# Patient Record
Sex: FEMALE | Race: WHITE | NOT HISPANIC OR LATINO | Employment: UNEMPLOYED | URBAN - METROPOLITAN AREA
[De-identification: names, ages, dates, MRNs, and addresses within clinical notes are randomized per-mention and may not be internally consistent; named-entity substitution may affect disease eponyms.]

---

## 2017-10-06 ENCOUNTER — HOSPITAL ENCOUNTER (EMERGENCY)
Facility: HOSPITAL | Age: 32
Discharge: HOME | End: 2017-10-06
Attending: EMERGENCY MEDICINE | Admitting: EMERGENCY MEDICINE
Payer: COMMERCIAL

## 2017-10-06 ENCOUNTER — APPOINTMENT (EMERGENCY)
Dept: CT IMAGING | Facility: HOSPITAL | Age: 32
End: 2017-10-06
Payer: COMMERCIAL

## 2017-10-06 VITALS
WEIGHT: 228.18 LBS | RESPIRATION RATE: 20 BRPM | TEMPERATURE: 98.3 F | OXYGEN SATURATION: 96 % | BODY MASS INDEX: 36.67 KG/M2 | SYSTOLIC BLOOD PRESSURE: 152 MMHG | HEIGHT: 66 IN | HEART RATE: 104 BPM | DIASTOLIC BLOOD PRESSURE: 75 MMHG

## 2017-10-06 DIAGNOSIS — S49.90XA: Primary | ICD-10-CM

## 2017-10-06 LAB
ANION GAP BLD CALC-SCNC: 10 MMOL/L (ref 4–13)
ANION GAP BLD CALC-SCNC: 15 MMOL/L (ref 4–13)
BUN BLD-MCNC: 11 MG/DL (ref 5–25)
BUN BLD-MCNC: 8 MG/DL (ref 5–25)
CA-I BLD-SCNC: 0.99 MMOL/L (ref 1.12–1.32)
CA-I BLD-SCNC: 1.16 MMOL/L (ref 1.12–1.32)
CHLORIDE BLD-SCNC: 106 MMOL/L (ref 100–108)
CHLORIDE BLD-SCNC: 108 MMOL/L (ref 100–108)
CREAT BLD-MCNC: 0.9 MG/DL (ref 0.6–1.3)
CREAT BLD-MCNC: 0.9 MG/DL (ref 0.6–1.3)
EXT PREG TEST URINE: NEGATIVE
GFR SERPL CREATININE-BSD FRML MDRD: 84 ML/MIN/1.73SQ M
GFR SERPL CREATININE-BSD FRML MDRD: 84 ML/MIN/1.73SQ M
GLUCOSE SERPL-MCNC: 108 MG/DL (ref 65–140)
GLUCOSE SERPL-MCNC: 96 MG/DL (ref 65–140)
HCT VFR BLD CALC: 40 % (ref 34.8–46.1)
HCT VFR BLD CALC: 41 % (ref 34.8–46.1)
HGB BLDA-MCNC: 13.6 G/DL (ref 11.5–15.4)
HGB BLDA-MCNC: 13.9 G/DL (ref 11.5–15.4)
PCO2 BLD: 24 MMOL/L (ref 21–32)
PCO2 BLD: 25 MMOL/L (ref 21–32)
POTASSIUM BLD-SCNC: 3.9 MMOL/L (ref 3.5–5.3)
POTASSIUM BLD-SCNC: 7 MMOL/L (ref 3.5–5.3)
SODIUM BLD-SCNC: 135 MMOL/L (ref 136–145)
SODIUM BLD-SCNC: 140 MMOL/L (ref 136–145)
SPECIMEN SOURCE: ABNORMAL
SPECIMEN SOURCE: ABNORMAL

## 2017-10-06 PROCEDURE — 80047 BASIC METABLC PNL IONIZED CA: CPT

## 2017-10-06 PROCEDURE — 81025 URINE PREGNANCY TEST: CPT | Performed by: EMERGENCY MEDICINE

## 2017-10-06 PROCEDURE — 96374 THER/PROPH/DIAG INJ IV PUSH: CPT

## 2017-10-06 PROCEDURE — 99284 EMERGENCY DEPT VISIT MOD MDM: CPT

## 2017-10-06 PROCEDURE — 71260 CT THORAX DX C+: CPT

## 2017-10-06 PROCEDURE — 85014 HEMATOCRIT: CPT

## 2017-10-06 RX ORDER — OXYCODONE HYDROCHLORIDE AND ACETAMINOPHEN 5; 325 MG/1; MG/1
1 TABLET ORAL ONCE
Status: COMPLETED | OUTPATIENT
Start: 2017-10-06 | End: 2017-10-06

## 2017-10-06 RX ORDER — DIAZEPAM 5 MG/1
5 TABLET ORAL EVERY 8 HOURS PRN
Qty: 10 TABLET | Refills: 0 | Status: SHIPPED | OUTPATIENT
Start: 2017-10-06 | End: 2017-10-11

## 2017-10-06 RX ORDER — DIAZEPAM 5 MG/1
5 TABLET ORAL ONCE
Status: COMPLETED | OUTPATIENT
Start: 2017-10-06 | End: 2017-10-06

## 2017-10-06 RX ORDER — KETOROLAC TROMETHAMINE 30 MG/ML
15 INJECTION, SOLUTION INTRAMUSCULAR; INTRAVENOUS ONCE
Status: COMPLETED | OUTPATIENT
Start: 2017-10-06 | End: 2017-10-06

## 2017-10-06 RX ORDER — NAPROXEN 500 MG/1
500 TABLET ORAL 2 TIMES DAILY WITH MEALS
Qty: 15 TABLET | Refills: 0 | Status: SHIPPED | OUTPATIENT
Start: 2017-10-06 | End: 2017-10-13

## 2017-10-06 RX ADMIN — KETOROLAC TROMETHAMINE 15 MG: 30 INJECTION, SOLUTION INTRAMUSCULAR at 14:29

## 2017-10-06 RX ADMIN — DIAZEPAM 5 MG: 5 TABLET ORAL at 15:46

## 2017-10-06 RX ADMIN — IOHEXOL 85 ML: 350 INJECTION, SOLUTION INTRAVENOUS at 14:39

## 2017-10-06 RX ADMIN — OXYCODONE HYDROCHLORIDE AND ACETAMINOPHEN 1 TABLET: 5; 325 TABLET ORAL at 17:04

## 2017-10-06 NOTE — DISCHARGE INSTRUCTIONS
Use valium for muscle relaxation at home  Use naproxen for pain control  Use the sling for comfort  RICE Therapy   WHAT YOU NEED TO KNOW:   What is RICE therapy? RICE therapy is a 4-step process used to reduce swelling and pain from an injury  RICE stands for rest, ice, compression, and elevation  RICE should be done within the first 24 to 48 hours after an injury  How do I use RICE therapy? · Rest  the injured area so that it can heal  You may need to avoid putting any weight on your injury for at least 48 hours  Return to normal activities as directed  · Ice  the injury for 20 minutes every 4 hours, or as directed  Use an ice pack, or put crushed ice in a plastic bag  Cover it with a towel to protect your skin  Ice helps prevent tissue damage and decreases swelling and pain  · Compress  the injury with an elastic bandage, air cast, special boot, or splint to reduce swelling  Ask your healthcare provider which compression device to use, and how tight it should be  · Elevate  the injured area above the level of your heart as often as you can  This will help decrease swelling and pain  If possible, prop the injured area on pillows or blankets to keep it elevated comfortably  When should I contact my healthcare provider? · Your pain does not decrease, even after treatment  · You have questions or concerns about your condition or care  When should I seek immediate care? · You have severe pain in the injured area  · You have numbness in the injured area  · You cannot put any weight on or move the injured area  CARE AGREEMENT:   You have the right to help plan your care  Learn about your health condition and how it may be treated  Discuss treatment options with your caregivers to decide what care you want to receive  You always have the right to refuse treatment  The above information is an  only   It is not intended as medical advice for individual conditions or treatments  Talk to your doctor, nurse or pharmacist before following any medical regimen to see if it is safe and effective for you  © 2017 [address] [employer] Information is for End User's use only and may not be sold, redistributed or otherwise used for commercial purposes  All illustrations and images included in CareNotes® are the copyrighted property of [employer]  or [address]  Shoulder Pain   WHAT YOU NEED TO KNOW:   What do I need to know about shoulder pain? Shoulder pain is a common problem and can affect your daily activities  Pain can be caused by a problem within your shoulder  Shoulder pain may also be caused by pain that spreads to your shoulder from another part of your body  What increases my risk for shoulder pain? · Repeated overhead activities, such as baseball, weight lifting, or swimming    · Medical conditions, such as rotator cuff disease, diabetes, or thyroid disorders    · A quick increase in the amount or intensity of exercises, or improper technique during exercise    · Muscle imbalance or weakness    · Trauma or a fall    · Age older than 61  How is shoulder pain diagnosed? Your healthcare provider will ask about your pain, including how and when it started  Tell him if you have any weakness or if there was an injury  He will examine your shoulder and do movement tests to see how well you can move your shoulder  You may also need any of the following tests:  · An x-ray, ultrasound, CT, or MRI  may be needed to show the cause of your shoulder pain  You may be given contrast liquid to help the shoulder area show up better in the pictures  Tell the healthcare provider if you have ever had an allergic reaction to contrast dye  Do not enter the MRI room with anything metal  Metal can cause serious injury  Tell the healthcare provider if you have any metal in or on your body      · An electromyography test  measures the electrical activity of your muscles at rest and with movement  How is shoulder pain treated? · Acetaminophen  decreases pain and fever  It is available without a doctor's order  Ask how much to take and how often to take it  Follow directions  Acetaminophen can cause liver damage if not taken correctly  · NSAIDs , such as ibuprofen, help decrease swelling, pain, and fever  This medicine is available with or without a doctor's order  NSAIDs can cause stomach bleeding or kidney problems in certain people  If you take blood thinner medicine, always ask your healthcare provider if NSAIDs are safe for you  Always read the medicine label and follow directions  · A steroid injection  may help decrease pain and swelling  · Surgery  may be needed for long-term pain and loss of function  How can I manage my symptoms? · Apply ice  on your shoulder for 20 to 30 minutes every 2 hours or as directed  Use an ice pack, or put crushed ice in a plastic bag  Cover it with a towel  Ice helps prevent tissue damage and decreases swelling and pain  · Apply heat if ice does not help your symptoms  Apply heat on your shoulder for 20 to 30 minutes every 2 hours for as many days as directed  Heat helps decrease pain and muscle spasms  · Go to physical or occupational therapy as directed  A physical therapist teaches you exercises to help improve movement and strength, and to decrease pain  An occupational therapist teaches you skills to help with your daily activities  How can I help prevent shoulder pain? · Stretch and strengthen your shoulder  Use proper technique during exercises and sports  · Limit activities as directed  Try to avoid repeated overhead movements  When should I seek immediate care or call 911? · You have severe pain  · You cannot move your arm or shoulder  · You have numbness or tingling in your shoulder or arm  When should I contact my healthcare provider?    · Your pain gets worse or does not go away with treatment  · You have trouble moving your arm or shoulder  · You have questions or concerns about your condition or care  CARE AGREEMENT:   You have the right to help plan your care  Learn about your health condition and how it may be treated  Discuss treatment options with your caregivers to decide what care you want to receive  You always have the right to refuse treatment  The above information is an  only  It is not intended as medical advice for individual conditions or treatments  Talk to your doctor, nurse or pharmacist before following any medical regimen to see if it is safe and effective for you  © 2017 [address] [facility] Information is for End User's use only and may not be sold, redistributed or otherwise used for commercial purposes  All illustrations and images included in CareNotes® are the copyrighted property of [employer]  or [employer]

## 2017-10-06 NOTE — ED PROVIDER NOTES
History  Chief Complaint   Patient presents with    Shoulder Injury     Pt was coming down water slide and rolled sideways onto right side and landed on right shoulder  Pt states pain in right shoulder and under right ribs  Pt had X-rays done at [address] [place] [address] [address]     27-year-old a dramatic female presents to the emergency department after having a shoulder injury today  She was going down the water slide today, got her shoulder caught on the way down and suffered injury to her R shoulder  She [facility] to [facility] Urgent [facility] and had an x-ray performed which was negative of her shoulder  They told her that she did not have osseous injury to these areas  However she was unsatisfied with these results and came to our hospital for further evaluation  She is still having pain in her shoulder, she is severely distressed and crying because she wants pain medicine  Nothing that she has taken thus far has been sufficient to control her pain  She has no history of injury to this shoulder in the past   She is not having any difficulty breathing  No chest pain  There is no palpable abnormality to her shoulder or arm  He has decreased range of motion secondary to pain but she is neurovascularly intact distal to the injury  None       History reviewed  No pertinent past medical history  History reviewed  No pertinent surgical history  History reviewed  No pertinent family history  I have reviewed and agree with the history as documented  Social History   Substance Use Topics    Smoking status: Current Every Day Smoker    Smokeless tobacco: Never Used    Alcohol use No        Review of Systems   Constitutional: Negative for chills, fatigue and fever  Respiratory: Negative for apnea, cough, chest tightness and shortness of breath  Cardiovascular: Negative for chest pain and palpitations  Gastrointestinal: Negative for abdominal pain, nausea and vomiting     Musculoskeletal: Negative for back pain and neck pain  Right shoulder pain, decreased range of motion secondary to pain   Skin: Negative for color change, rash and wound  Allergic/Immunologic: Negative for immunocompromised state  Neurological: Negative for dizziness, syncope, weakness, numbness and headaches  Physical Exam  ED Triage Vitals [10/06/17 1326]   Temperature Pulse Respirations Blood Pressure SpO2   98 3 °F (36 8 °C) 104 20 152/75 96 %      Temp Source Heart Rate Source Patient Position - Orthostatic VS BP Location FiO2 (%)   Oral Monitor Sitting Left arm --      Pain Score       Worst Possible Pain           Physical Exam   Constitutional: She is oriented to person, place, and time  She appears well-developed and well-nourished  She appears distressed (Very anxious, tearful, refuses to range arm,)  HENT:   Head: Normocephalic and atraumatic  Mouth/Throat: Oropharynx is clear and moist    Eyes: Conjunctivae and EOM are normal  Pupils are equal, round, and reactive to light  Right eye exhibits no discharge  Left eye exhibits no discharge  No scleral icterus  Neck: Normal range of motion  Neck supple  No JVD present  Cardiovascular: Normal rate, regular rhythm, normal heart sounds and intact distal pulses  Exam reveals no gallop and no friction rub  No murmur heard  Pulmonary/Chest: Effort normal and breath sounds normal  No respiratory distress  She has no wheezes  She has no rales  She exhibits no tenderness  Abdominal: Soft  Bowel sounds are normal  She exhibits no distension  There is no tenderness  There is no guarding  Musculoskeletal: She exhibits tenderness  She exhibits no edema or deformity  Tenderness to right shoulder without deformity  Decreased range of motion secondary to pain  She is holding her arm splinting and across her abdomen secondary to pain in her shoulder however there are no wounds, no ecchymosis  Neurological: She is alert and oriented to person, place, and time   No cranial nerve deficit  Skin: Skin is warm and dry  No rash noted  She is not diaphoretic  No erythema  No pallor  No wound, no ecchymosis, no external evidence of trauma to the arm or shoulder or chest wall  Psychiatric:   Anxious, dramatic regarding injuries   Vitals reviewed        ED Medications  Medications   ketorolac (TORADOL) 30 mg/mL injection 15 mg (15 mg Intravenous Given 10/6/17 1429)   iohexol (OMNIPAQUE) 350 MG/ML injection (MULTI-DOSE) 85 mL (85 mL Intravenous Given 10/6/17 1439)   diazepam (VALIUM) tablet 5 mg (5 mg Oral Given 10/6/17 1546)   oxyCODONE-acetaminophen (PERCOCET) 5-325 mg per tablet 1 tablet (1 tablet Oral Given 10/6/17 5804)       Diagnostic Studies  Labs Reviewed   POCT CHEM 8+ - Abnormal        Result Value Ref Range Status    SODIUM, I-STAT 135 (*) 136 - 145 mmol/l Final    Potassium, i-STAT 7 0 (*) 3 5 - 5 3 mmol/L Final    Calcium, Ionized i-STAT 0 99 (*) 1 12 - 1 32 mmol/L Final    Chloride, istat 108  100 - 108 mmol/L Final    CO2, i-STAT 25  21 - 32 mmol/L Final    Anion Gap, Istat 10  4 - 13 mmol/L Final    BUN, I-STAT 11  5 - 25 mg/dl Final    Creatinine, i-STAT 0 9  0 6 - 1 3 mg/dl Final    eGFR 84  ml/min/1 73sq m Final    Glucose, i-STAT 108  65 - 140 mg/dl Final    Hct, i-STAT 41  34 8 - 46 1 % Final    Hgb, i-STAT 13 9  11 5 - 15 4 g/dl Final    Specimen Type VENOUS   Final   POCT CHEM 8+ - Abnormal     Anion Gap, Istat 15 (*) 4 - 13 mmol/L Final    SODIUM, I-STAT 140  136 - 145 mmol/l Final    Potassium, i-STAT 3 9  3 5 - 5 3 mmol/L Final    Chloride, istat 106  100 - 108 mmol/L Final    CO2, i-STAT 24  21 - 32 mmol/L Final    Calcium, Ionized i-STAT 1 16  1 12 - 1 32 mmol/L Final    BUN, I-STAT 8  5 - 25 mg/dl Final    Creatinine, i-STAT 0 9  0 6 - 1 3 mg/dl Final    eGFR 84  ml/min/1 73sq m Final    Glucose, i-STAT 96  65 - 140 mg/dl Final    Hct, i-STAT 40  34 8 - 46 1 % Final    Hgb, i-STAT 13 6  11 5 - 15 4 g/dl Final    Specimen Type VENOUS   Final   POCT PREGNANCY, URINE - Normal    EXT PREG TEST UR (Ref: Negative) negative   Final       CT chest with contrast   ED Interpretation   No acute pathology  No acute fracture  No destructive osseous   lesion  Final Result      No acute pathology  Workstation performed: [id number]             Procedures  Procedures      Phone Contacts  ED Phone Contact    ED Course  ED Course as of Oct 16 1732   Fri Oct 06, 2017   1546 Discussed the risks and benefits of x-ray to evaluate for osseous injury in light of the fact that there would be no benefit and no change in management  Patient agrees and does not want an x-ray performed  As such we will attempt further pain control for her pain  This seems likely 2/2 anxiety                                MDM  Number of Diagnoses or Management Options  Traumatic injury of shoulder:   Diagnosis management comments: X-rays were negative for osseous injury  Still in extreme pain  Will do a CT scan to rule out rib injury and shoulder injury  Patient denies that anything given here was helpful for pain control  She is still distress secondary to pain  The CT scan is negative for injury  Advised follow up w PCP for further eval and pain control  Discussed with patient and they understood the risks and benefits of discharge  Patient had opportunity to ask questions regarding care and discharge instructions and had no further questions  Advised follow up with PCP, advised returning if worsening, and discussed disease specific return precautions  Patient understood discharge instructions  CritCare Time    Disposition  Final diagnoses:   Traumatic injury of shoulder     ED Disposition     ED Disposition Condition Comment    Discharge  [address] [facility] discharge to home/self care      Condition at discharge: Good        Follow-up Information     Follow up With Specialties Details Why Contact Info Additional [address] [facility] Emergency Department Emergency Medicine  If symptoms worsen: chest pain, difficulty breathing, severe pain, etc [address] [facility] [address]  [phone] [place] [facility], [address] [place], [place], [address] Ludwig MD Orthopedic Surgery Schedule an appointment as soon as possible for a visit if still having extremity pain [address] [facility]  [phone]       Your PCP for follow up             Discharge Medication List as of 10/6/2017  4:56 PM      START taking these medications    Details   diazepam (VALIUM) 5 mg tablet Take 1 tablet by mouth every 8 (eight) hours as needed for muscle spasms for up to 5 days, Starting Fri 10/6/2017, Until Wed 10/11/2017, Print      naproxen (NAPROSYN) 500 mg tablet Take 1 tablet by mouth 2 (two) times a day with meals for 7 days, Starting Fri 10/6/2017, Until Fri 10/13/2017, Print           No discharge procedures on file      ED Provider  Electronically Signed by       Monalisa Vergara DO  10/16/17 0978